# Patient Record
Sex: FEMALE | Race: WHITE | Employment: FULL TIME | URBAN - METROPOLITAN AREA
[De-identification: names, ages, dates, MRNs, and addresses within clinical notes are randomized per-mention and may not be internally consistent; named-entity substitution may affect disease eponyms.]

---

## 2019-02-13 ENCOUNTER — HOSPITAL ENCOUNTER (OUTPATIENT)
Age: 44
Discharge: HOME | End: 2019-02-13

## 2019-02-13 LAB — RUBV IGG SER QL: 421.4 IU/ML

## 2019-02-13 PROCEDURE — 86787 VARICELLA-ZOSTER ANTIBODY: CPT

## 2019-02-13 PROCEDURE — 86762 RUBELLA ANTIBODY: CPT

## 2019-02-13 PROCEDURE — 86735 MUMPS ANTIBODY: CPT

## 2019-02-13 PROCEDURE — 86481 TB AG RESPONSE T-CELL SUSP: CPT

## 2019-02-13 PROCEDURE — 86765 RUBEOLA ANTIBODY: CPT

## 2019-02-14 LAB
MEASLES IMMUNE (IGG): 4.09
MUV IGG SER QL: 6.22
VZV IGG SER QL IA: 2.23

## 2019-02-18 LAB — T-SPOT TB TEST: NORMAL

## 2019-04-30 ENCOUNTER — EMPLOYEE WELLNESS (OUTPATIENT)
Dept: OTHER | Age: 44
End: 2019-04-30

## 2019-04-30 LAB
CHOLESTEROL/HDL RATIO: 3.1
CHOLESTEROL: 154 MG/DL
GLUCOSE BLD-MCNC: 125 MG/DL (ref 70–99)
HDLC SERPL-MCNC: 50 MG/DL
LDL CHOLESTEROL: 74 MG/DL (ref 0–130)
PATIENT FASTING?: NO
TRIGL SERPL-MCNC: 149 MG/DL
VLDLC SERPL CALC-MCNC: ABNORMAL MG/DL (ref 1–30)

## 2019-05-08 VITALS — WEIGHT: 191 LBS

## 2020-03-23 ENCOUNTER — NURSE TRIAGE (OUTPATIENT)
Dept: OTHER | Facility: CLINIC | Age: 45
End: 2020-03-23

## 2020-03-23 NOTE — TELEPHONE ENCOUNTER
Reason for Disposition   Cough with no complications    Answer Assessment - Initial Assessment Questions  1. ONSET: \"When did the cough begin? \"       Yesterday around 5:00pm - dry cough    2. SEVERITY: \"How bad is the cough today? \"       Felt better after lying down last night - did not keep her from sleep    3. RESPIRATORY DISTRESS: \"Describe your breathing. \"       Breathing normally - no wheezing - feels chest congestion but not able to cough anything up    4. FEVER: \"Do you have a fever? \" If so, ask: \"What is your temperature, how was it measured, and when did it start? \"      No fever    5. HEMOPTYSIS: \"Are you coughing up any blood? \" If so ask: \"How much? \" (flecks, streaks, tablespoons, etc.)      Denies    6. TREATMENT: \"What have you done so far to treat the cough? \" (e.g., meds, fluids, humidifier)      Nothing    7. CARDIAC HISTORY: \"Do you have any history of heart disease? \" (e.g., heart attack, congestive heart failure)       Denies    8. LUNG HISTORY: \"Do you have any history of lung disease? \"  (e.g., pulmonary embolus, asthma, emphysema)      Denies    9. PE RISK FACTORS: \"Do you have a history of blood clots? \" (or: recent major surgery, recent prolonged travel, bedridden)      Denies    10. OTHER SYMPTOMS: \"Do you have any other symptoms? (e.g., runny nose, wheezing, chest pain)       Frontal headache - rates as 5-6/10 and scratchy throat - no pain - drinking and eating without issue    11. PREGNANCY: \"Is there any chance you are pregnant? \" \"When was your last menstrual period? \"        Denies    12. TRAVEL: \"Have you traveled out of the country in the last month? \" (e.g., travel history, exposures)        Denies    Protocols used: COUGH-ADULT-[place]    Caller reports symptoms as documented above. Caller informed of disposition. Home care advice reviewed and number for employee services and associate health, safety and absence management resource person provided.   Verbalized understanding of all

## 2020-04-29 ENCOUNTER — HOSPITAL ENCOUNTER (OUTPATIENT)
Age: 45
Discharge: HOME | End: 2020-04-29
Payer: COMMERCIAL

## 2020-04-29 PROCEDURE — U0002 COVID-19 LAB TEST NON-CDC: HCPCS

## 2020-04-30 LAB
SARS-COV-2, PCR: NOT DETECTED
SARS-COV-2, RAPID: NORMAL
SARS-COV-2: NORMAL
SOURCE: NORMAL

## 2020-05-01 ENCOUNTER — TELEPHONE (OUTPATIENT)
Dept: PRIMARY CARE CLINIC | Age: 45
End: 2020-05-01

## 2020-05-11 ENCOUNTER — TELEPHONE (OUTPATIENT)
Dept: PRIMARY CARE CLINIC | Age: 45
End: 2020-05-11

## 2020-05-27 ENCOUNTER — HOSPITAL ENCOUNTER (OUTPATIENT)
Age: 45
Setting detail: SPECIMEN
Discharge: HOME | End: 2020-05-27

## 2020-05-29 LAB — SARS-COV-2, NAA: NOT DETECTED

## 2020-05-30 ENCOUNTER — TELEPHONE (OUTPATIENT)
Dept: PRIMARY CARE CLINIC | Age: 45
End: 2020-05-30

## 2020-08-13 ENCOUNTER — EMPLOYEE WELLNESS (OUTPATIENT)
Dept: OTHER | Age: 45
End: 2020-08-13

## 2020-10-19 VITALS — WEIGHT: 191 LBS

## 2020-11-13 ENCOUNTER — HOSPITAL ENCOUNTER (OUTPATIENT)
Dept: MAMMOGRAPHY | Age: 45
Discharge: HOME | End: 2020-11-15
Payer: COMMERCIAL

## 2020-11-13 PROCEDURE — 77063 BREAST TOMOSYNTHESIS BI: CPT
